# Patient Record
(demographics unavailable — no encounter records)

---

## 2024-11-08 NOTE — END OF VISIT
[] : Resident [FreeTextEntry3] : Patient's history corroborated by his father, who was present at visit. Patient with recurrent syncopal episodes for the past 10 years that happen about once/year that are preceded by "rush" to head with panic and then some tingling in his extremities. Episodes usually preceded by changing position, mainly from sitting to standing or fully standing. Last one in 4/2024. He also reports some sleep issues and he is taking mirtazapine, but this is being addressed by his psychiatrist (sees once/month). 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  AAO x3, CN's WNL with b/l horizontal end gaze fatigable nystagmus, strength 5/5 all, FtN intact b/l  Syncopal episodes likely due to peripheral/cardiac but cannot completely exclude seizures. Less likely impact from colloid cyst. May also have impact from sleep and psychiatric issues.  - MRI brain w/ and w/o - Ambulatory EEG - Refer to [facility] - Orthostatics, TTE - Continue psychiatry follow-up - RTC in 2-3 months [Time Spent: ___ minutes] : I have spent [unfilled] minutes of time on the encounter which excludes teaching and separately reported services.

## 2024-11-08 NOTE — HISTORY OF PRESENT ILLNESS
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   Patient's first episode was about 10 years ago, while in school from a standing position. He has had episodes about yearly since then. These episodes are associated with numbness/ tingling in the whole body, a rush of heat to the head, and panic. They typically happen when standing or raising from a seated position. It has never happened while seated. After workup of his second episode he was found to have a colloid cyst in the anterior 3rd ventricle which was previously followed by neurosurgery. [facility] is an MRI Brain from 2022 which indicates stability of the cyst. He reports he has not followed with a neurosurgeon for 5 years as he has been in & out of longterm for various reason. He is in a rehab program.   Patient reports sleep difficulties, R knee pain (Known meniscus tear) and fatigue. Patient denies headache, nausea, vomiting, slurred speech, dizziness, vision changes, hearing changes, focal weakness or focal numbness.

## 2024-11-08 NOTE — PHYSICAL EXAM
[General Appearance - Alert] : alert [General Appearance - Well-Appearing] : healthy appearing [Oriented To Time, Place, And Person] : oriented to person, place, and time [Affect] : the affect was normal [Person] : oriented to person [Place] : oriented to place [Time] : oriented to time [Cranial Nerves Optic (II)] : visual acuity intact bilaterally,  visual fields full to confrontation, pupils equal round and reactive to light [Cranial Nerves Oculomotor (III)] : extraocular motion intact [Cranial Nerves Trigeminal (V)] : facial sensation intact symmetrically [Cranial Nerves Facial (VII)] : face symmetrical [Cranial Nerves Vestibulocochlear (VIII)] : hearing was intact bilaterally [Cranial Nerves Glossopharyngeal (IX)] : tongue and palate midline [Cranial Nerves Accessory (XI - Cranial And Spinal)] : head turning and shoulder shrug symmetric [Cranial Nerves Hypoglossal (XII)] : there was no tongue deviation with protrusion [Motor Tone] : muscle tone was normal in all four extremities [Motor Strength] : muscle strength was normal in all four extremities [Sensation Tactile Decrease] : light touch was intact [Sensation Pain / Temperature Decrease] : pain and temperature was intact [Abnormal Walk] : normal gait [2+] : Ankle jerk left 2+ [Sclera] : the sclera and conjunctiva were normal [PERRL With Normal Accommodation] : pupils were equal in size, round, reactive to light, with normal accommodation [Extraocular Movements] : extraocular movements were intact [Outer Ear] : the ears and nose were normal in appearance [Neck Appearance] : the appearance of the neck was normal [] : no respiratory distress

## 2024-11-08 NOTE — DISCUSSION/SUMMARY
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  These episodes are associated with numbness/ tingling in the whole body, a rush of heat to the head, and panic. They typically happen when standing or raising from a seated position.  Impression: Syncope with positional component, associated tingling and feeling of heat rising to head. Likely cardiogenic, rule out other neurologic cause. History of colloid cyst of 3rd ventricle, currently not being monitored per patient.  Plan:  [] OVS check [] TTE [] 48 hr aEEG [] Neurosurgery referral for cyst monitoring [] MR Brain with & without contrast [] Psych follow up with own psychiatrist [] RTC 2 - 3 months  Case discussed with Dr. Jackson.

## 2024-11-08 NOTE — REVIEW OF SYSTEMS
[No Pertinent Cardiac History] : no history of aortic stenosis, atrial fibrillation, coronary artery disease, recent myocardial infarction, or implantable device/pacemaker [No Pertinent Pulmonary History] : no history of asthma, COPD, sleep apnea, or smoking [No Adverse Anesthesia Reaction] : no adverse anesthesia reaction in self or family member [(Patient denies any chest pain, claudication, dyspnea on exertion, orthopnea, palpitations or syncope)] : Patient denies any chest pain, claudication, dyspnea on exertion, orthopnea, palpitations or syncope [Good (7-10 METs)] : Good (7-10 METs) [Aortic Stenosis] : no aortic stenosis [Atrial Fibrillation] : no atrial fibrillation [Coronary Artery Disease] : no coronary artery disease [Recent Myocardial Infarction] : no recent myocardial infarction [Implantable Device/Pacemaker] : no implantable device/pacemaker [Asthma] : no asthma [COPD] : no COPD [Sleep Apnea] : no sleep apnea [Smoker] : not a smoker [Family Member] : no family member with adverse anesthesia reaction/sudden death [Self] : no previous adverse anesthesia reaction [Chronic Anticoagulation] : no chronic anticoagulation [Chronic Kidney Disease] : no chronic kidney disease [Diabetes] : no diabetes [FreeTextEntry1] : Laparoscopic Fundoplication for chronic reflux [FreeTextEntry2] : 5/17/2021 [FreeTextEntry3] : Dr Ian New [As Noted in HPI] : as noted in HPI

## 2024-12-13 NOTE — HISTORY OF PRESENT ILLNESS
[None] : None [FreeTextEntry1] : : 1994 Referring Provider: none   HPI:  Mr. ORLANDO SY is a 30 year M with a PMHx notable for - on suboxone, remeron - was seen in ED a few weeks ago for sudden b/l flank pain without any n/v/c/d, f/c- had CT scan which shows a small non obstructing kidney stone. Pt feels well currently- denies any urinary or stone complaints. Pt's first stone but interested in stone prevention.    Anticoagulation: None All: None PMHx: Colloid cyst in brain PSHx: Torn ACL FHx: No cancers; no kidney stones SocHx: vaping ; social ETOH Clearance:   Imaging: CT A/P 2024 KIDNEYS/URETERS: No hydronephrosis. Small nonobstructing 2 mm left lower pole renal calculus. IMPRESSION: No obstructive uropathy. Small nonobstructing left renal calculus.

## 2024-12-13 NOTE — ASSESSMENT
[FreeTextEntry1] : 30M here for left renal stone  #Left Renal Stone -Pt currently doing well- no stone symptoms- reiterated ED precautions.  -Pt interested in pursuing stone prevention- to do 24hr urine collection with [facility] (script provided)- will let me know when done and set up TEB for review.